# Patient Record
Sex: FEMALE | Race: BLACK OR AFRICAN AMERICAN | NOT HISPANIC OR LATINO | Employment: FULL TIME | URBAN - METROPOLITAN AREA
[De-identification: names, ages, dates, MRNs, and addresses within clinical notes are randomized per-mention and may not be internally consistent; named-entity substitution may affect disease eponyms.]

---

## 2022-04-27 ENCOUNTER — HOSPITAL ENCOUNTER (EMERGENCY)
Facility: HOSPITAL | Age: 27
Discharge: HOME | End: 2022-04-27
Attending: EMERGENCY MEDICINE
Payer: COMMERCIAL

## 2022-04-27 VITALS
HEART RATE: 93 BPM | DIASTOLIC BLOOD PRESSURE: 78 MMHG | OXYGEN SATURATION: 100 % | HEIGHT: 67 IN | WEIGHT: 157 LBS | RESPIRATION RATE: 18 BRPM | TEMPERATURE: 99 F | SYSTOLIC BLOOD PRESSURE: 133 MMHG | BODY MASS INDEX: 24.64 KG/M2

## 2022-04-27 DIAGNOSIS — L02.91 ABSCESS: Primary | ICD-10-CM

## 2022-04-27 LAB
B-HCG UR QL: NEGATIVE
CTP QC/QA: YES

## 2022-04-27 PROCEDURE — 81025 URINE PREGNANCY TEST: CPT | Performed by: EMERGENCY MEDICINE

## 2022-04-27 PROCEDURE — 99284 EMERGENCY DEPT VISIT MOD MDM: CPT | Mod: 25

## 2022-04-27 RX ORDER — IBUPROFEN 800 MG/1
800 TABLET ORAL EVERY 6 HOURS PRN
Qty: 20 TABLET | Refills: 0 | Status: SHIPPED | OUTPATIENT
Start: 2022-04-27

## 2022-04-27 RX ORDER — SULFAMETHOXAZOLE AND TRIMETHOPRIM 800; 160 MG/1; MG/1
1 TABLET ORAL 2 TIMES DAILY
Qty: 14 TABLET | Refills: 0 | Status: SHIPPED | OUTPATIENT
Start: 2022-04-27 | End: 2022-05-04

## 2022-04-27 NOTE — ED PROVIDER NOTES
Encounter Date: 4/27/2022    SCRIBE #1 NOTE: I, aHyes Birmnigham, am scribing for, and in the presence of,  SAIDA Iyer. I have scribed the following portions of the note - Other sections scribed: HPI, ROS, PE.       History     Chief Complaint   Patient presents with    Cyst     Pt c/c of a mass on her L elbow since Friday. Pt arm is hot to touch and swollen. Pt says that there is a discharge from the area.     27 y.o. female, with no pertinent past medical history presents to the ED with an abscess to her left elbow that formed 5 days ago. Pt states that she popped the abscess and it has been steadily draining. Pt reports associated left arm swelling. Pt states that the pain from the abscess worsens when bending her arm. Pt states that she presented to urgent care prior to arrival but they sent her here. No other exacerbating or alleviating factors. Patient denies other associated symptoms.       The history is provided by the patient. No  was used.     Review of patient's allergies indicates:  No Known Allergies  Past Medical History:   Diagnosis Date    Miscarriage      No past surgical history on file.  No family history on file.     Review of Systems   Constitutional: Negative for chills and fever.   HENT: Negative for congestion, rhinorrhea and sore throat.    Eyes: Negative for visual disturbance.   Respiratory: Negative for cough and shortness of breath.    Cardiovascular: Negative for chest pain.   Gastrointestinal: Negative for abdominal pain, diarrhea, nausea and vomiting.   Genitourinary: Negative for dysuria, frequency and hematuria.   Musculoskeletal: Negative for back pain.        (+) arm swelling   Skin: Negative for rash.        (+) abscess  (+) drainage   Neurological: Negative for dizziness, weakness and headaches.       Physical Exam     Initial Vitals [04/27/22 1253]   BP Pulse Resp Temp SpO2   133/78 93 18 98.7 °F (37.1 °C) 100 %      MAP       --          Physical Exam    Nursing note and vitals reviewed.  Constitutional: She appears well-developed and well-nourished. She is not diaphoretic. No distress.   HENT:   Head: Normocephalic and atraumatic.   Right Ear: External ear normal.   Left Ear: External ear normal.   Mouth/Throat: No oropharyngeal exudate.   Eyes: EOM are normal. Pupils are equal, round, and reactive to light. Right eye exhibits no discharge. Left eye exhibits no discharge.   Neck: No JVD present.   Cardiovascular: Normal rate and intact distal pulses.   Pulmonary/Chest: No respiratory distress.   Abdominal: She exhibits no distension. There is no guarding.   Musculoskeletal:         General: Normal range of motion.      Comments: Full range of motion of left elbow.      Neurological: She is alert and oriented to person, place, and time. GCS score is 15. GCS eye subscore is 4. GCS verbal subscore is 5. GCS motor subscore is 6.   Skin: Skin is warm.   Drainage abscess located to the pt's left elbow. Swelling and erythema noted.   Psychiatric: She has a normal mood and affect. Her behavior is normal.         ED Course   Procedures  Labs Reviewed   POCT URINE PREGNANCY          Imaging Results    None          Medications - No data to display  Medical Decision Making:   History:   Old Medical Records: I decided to obtain old medical records.  Initial Assessment:   Patient has a draining abscess to left elbow that does not require incision at this time.  Patient has full range of motion of left elbow, do not suspect septic joint at this time.  Patient instructed to apply warm compresses to affected area 3 times a day.  Will discharge patient home on Bactrim and ibuprofen for pain.  Patient given return precautions.  Patient is stable discharge.          Scribe Attestation:   Scribe #1: I performed the above scribed service and the documentation accurately describes the services I performed. I attest to the accuracy of the note.                  Clinical Impression:   Final diagnoses:  [L02.91] Abscess (Primary)          ED Disposition Condition    Discharge Stable        ED Prescriptions     Medication Sig Dispense Start Date End Date Auth. Provider    sulfamethoxazole-trimethoprim 800-160mg (BACTRIM DS) 800-160 mg Tab Take 1 tablet by mouth 2 (two) times daily. for 7 days 14 tablet 4/27/2022 5/4/2022 SAIDA Iyer    ibuprofen (ADVIL,MOTRIN) 800 MG tablet Take 1 tablet (800 mg total) by mouth every 6 (six) hours as needed for Pain. 20 tablet 4/27/2022  SAIDA Iyer        Follow-up Information    None      I, Carmen Harkins PA-C  personally performed the services described in this documentation. All medical record entries made by the scribe were at my direction and in my presence. I have reviewed the chart and agree that the record reflects my personal performance and is accurate and complete.       SAIDA Iyer  04/27/22 0973

## 2022-04-27 NOTE — ED TRIAGE NOTES
Pt reports that she noticed a boil on her left elbow on Friday. State that she visited Urgent Care today and was advised to come to the ED. Drainage with blood  noted coming from site. C/O pain rated at an 8 on a PRS of 0-10 in area; denies fever.